# Patient Record
Sex: MALE | Race: WHITE | NOT HISPANIC OR LATINO | Employment: FULL TIME | URBAN - METROPOLITAN AREA
[De-identification: names, ages, dates, MRNs, and addresses within clinical notes are randomized per-mention and may not be internally consistent; named-entity substitution may affect disease eponyms.]

---

## 2022-12-12 ENCOUNTER — HOSPITAL ENCOUNTER (EMERGENCY)
Facility: HOSPITAL | Age: 22
Discharge: HOME | End: 2022-12-12
Attending: EMERGENCY MEDICINE | Admitting: EMERGENCY MEDICINE

## 2022-12-12 VITALS
BODY MASS INDEX: 21.82 KG/M2 | TEMPERATURE: 98.1 F | HEIGHT: 74 IN | HEART RATE: 66 BPM | OXYGEN SATURATION: 98 % | DIASTOLIC BLOOD PRESSURE: 68 MMHG | RESPIRATION RATE: 18 BRPM | SYSTOLIC BLOOD PRESSURE: 100 MMHG | WEIGHT: 170 LBS

## 2022-12-12 DIAGNOSIS — K62.5 RECTAL BLEEDING: Primary | ICD-10-CM

## 2022-12-12 DIAGNOSIS — K64.9 HEMORRHOIDS, UNSPECIFIED HEMORRHOID TYPE: ICD-10-CM

## 2022-12-12 PROCEDURE — 99282 EMERGENCY DEPT VISIT SF MDM: CPT

## 2022-12-12 RX ORDER — COCOA BUTTER, PHENYLEPHRINE HYDROCHLORIDE 2211; 6.25 MG/1; MG/1
1 SUPPOSITORY RECTAL AS NEEDED
Qty: 1 SUPPOSITORY | Refills: 0 | Status: SHIPPED | OUTPATIENT
Start: 2022-12-12

## 2022-12-12 NOTE — ED PROVIDER NOTES
[place]    EMERGENCY DEPARTMENT ENCOUNTER      Pt Name: Tyrell Aggarwal  MRN: [id number]  YOB: 2000  Date of evaluation: 12/12/2022  Provider: Rob Talamantes MD    CHIEF COMPLAINT       Chief Complaint   Patient presents with   • Rectal Bleeding         HISTORY OF PRESENT ILLNESS  (Location/Symptom, Timing/Onset, Context/Setting, Quality, Duration, Modifying Factors, Severity.)   Tyrell Aggarwal is a 22 y.o. male who presents to the emergency department with small amount of rectal bleeding that occurred just prior to arrival.  Patient states that he has a history of hemorrhoids and noticed some blood on the tissue paper when he wiped.  He denies any abdominal pain or rectal pain.  No history of significant bleeding and he is not on any anticoagulant medications.      Nursing notes were reviewed.    REVIEW OF SYSTEMS    (2-9 systems for level 4, 10 or more for level 5)   ROS:  General:  No fevers, no chills, no weakness  Cardiovascular:  No chest pain, no palpitations  Respiratory:  No shortness of breath, no cough, no wheezing  Gastrointestinal: Rectal bleeding  Musculoskeletal:  No muscle pain, no joint pain  Skin:  No rash  Neurologic:  No speech problems, no headache, no extremity numbness, no extremity tingling, no extremity weakness  Psychiatric:  No anxiety  Genitourinary:  No dysuria, no hematuria    Except as noted above the remainder of the review of systems was reviewed and negative.       PAST MEDICAL HISTORY   None    SURGICAL HISTORY     No past surgical history on file.      CURRENT MEDICATIONS     None    ALLERGIES     Morphine and Sulfa antibiotics    FAMILY HISTORY     No family history on file.       SOCIAL HISTORY       Social History     Socioeconomic History   • Marital status: Single         PHYSICAL EXAM    (up to 7 for level 4, 8 or more for level 5)     Vitals:    12/12/22 1530   BP: 100/68   BP Location: Left arm   Patient Position: Sitting   Pulse: 66   Resp: 18   Temp: 98.1 °F  "(36.7 °C)   TempSrc: Oral   SpO2: 98%   Weight: 77.1 kg (170 lb)   Height: 188 cm (74\")       Physical Exam  General: Awake, alert, no acute distress.  HEENT: Conjunctivae normal.  Neck: Trachea midline.  Cardiac: Heart regular rate, rhythm, no murmurs, rubs, or gallops  Lungs: Lungs are clear to auscultation, there is no wheezing, rhonchi, or rales. There is no use of accessory muscles.  Chest wall: There is no tenderness to palpation over the chest wall or over ribs  Abdomen: Abdomen is soft, nontender, nondistended. There are no firm or pulsatile masses, no rebound rigidity or guarding.  No external hemorrhoids or evidence of active bleeding identified on rectal examination.  Musculoskeletal: No deformity.  Neuro: Alert and oriented x 4.  Dermatology: Skin is warm and dry  Psych: Mentation is grossly normal, cognition is grossly normal. Affect is appropriate.          EMERGENCY DEPARTMENT COURSE and DIFFERENTIAL DIAGNOSIS/MDM:   Vitals:    Vitals:    12/12/22 1530   BP: 100/68   BP Location: Left arm   Patient Position: Sitting   Pulse: 66   Resp: 18   Temp: 98.1 °F (36.7 °C)   TempSrc: Oral   SpO2: 98%   Weight: 77.1 kg (170 lb)   Height: 188 cm (74\")            Patient very well-appearing and nontoxic on ED evaluation with normal vital signs.  Small amount of blood on the tissue paper and there is no historical evidence of significant bleeding.  Do not feel that laboratory testing is warranted at this point.  No evidence of active bleeding or hemorrhoids on physical exam and not consistent with emergent cause of GI bleeding.    I had a discussion with the patient/family regarding diagnosis, diagnostic results, treatment plan, and medications.  The patient/family indicated understanding of these instructions.  I spent adequate time at the bedside preceding discharge necessary to personally discuss the aftercare instructions, giving patient education, providing explanations of the results of our " evaluations/findings, and my decision making to assure that the patient/family understand the plan of care.  Time was allotted to answer questions at that time and throughout the ED course.  Emphasis was placed on timely follow-up after discharge.  I also discussed the potential for the development of an acute emergent condition requiring further evaluation, admission, or even surgical intervention. I discussed that we found nothing during the visit today indicating the need for further workup, admission, or the presence of an unstable medical condition.  I encouraged the patient to return to the emergency department immediately for ANY concerns, worsening, new complaints, or if symptoms persist and unable to seek follow-up in a timely fashion.  The patient/family expressed understanding and agreement with this plan.  The patient will follow-up with their PCP in 1-2 days for reevaluation.       MEDICATIONS ADMINISTERED IN ED:  Medications - No data to display    PROCEDURES:  Procedures    CRITICAL CARE TIME    Total Critical Care time was 0 minutes, excluding separately reportable procedures.   There was a high probability of clinically significant/life threatening deterioration in the patient's condition which required my urgent intervention.      FINAL IMPRESSION      1. Rectal bleeding    2. Hemorrhoids, unspecified hemorrhoid type          DISPOSITION/PLAN     ED Disposition     ED Disposition   Discharge    Condition   Stable    Comment   --             PATIENT REFERRED TO:  [facility] Emergency Department  [address] [address] [phone]    If symptoms worsen    Oneil Page MD  [address]  [place] [address]  [phone]    Schedule an appointment as soon as possible for a visit in 2 days        DISCHARGE MEDICATIONS:     Medication List      START taking these medications    Preparation H 0.25-88.44 % suppository  suppository  Generic drug: phenylephrine-cocoa Butter  Insert 1 suppository into the rectum As Needed for Hemorrhoids.           Where to Get Your Medications      These medications were sent to [facility] [address] [place] [address] - [address] - [phone]  - [phone] FX  [address] [place] [id number]    Phone: [phone]   · Preparation H 0.25-88.44 % suppository suppository             Comment: Please note this report has been produced using speech recognition software.      Rob Talamantes MD  Attending Emergency Physician               Rob Talamantes MD  12/18/22 2010